# Patient Record
Sex: MALE | Race: WHITE | NOT HISPANIC OR LATINO | Employment: PART TIME | URBAN - METROPOLITAN AREA
[De-identification: names, ages, dates, MRNs, and addresses within clinical notes are randomized per-mention and may not be internally consistent; named-entity substitution may affect disease eponyms.]

---

## 2021-06-09 ENCOUNTER — HOSPITAL ENCOUNTER (EMERGENCY)
Facility: CLINIC | Age: 31
Discharge: HOME | End: 2021-06-09
Attending: EMERGENCY MEDICINE | Admitting: EMERGENCY MEDICINE
Payer: COMMERCIAL

## 2021-06-09 VITALS
OXYGEN SATURATION: 100 % | BODY MASS INDEX: 20.3 KG/M2 | HEART RATE: 78 BPM | DIASTOLIC BLOOD PRESSURE: 86 MMHG | HEIGHT: 71 IN | TEMPERATURE: 97.4 F | WEIGHT: 145 LBS | RESPIRATION RATE: 18 BRPM | SYSTOLIC BLOOD PRESSURE: 135 MMHG

## 2021-06-09 DIAGNOSIS — F10.920 ALCOHOLIC INTOXICATION WITHOUT COMPLICATION (H): ICD-10-CM

## 2021-06-09 PROCEDURE — 99283 EMERGENCY DEPT VISIT LOW MDM: CPT

## 2021-06-09 RX ORDER — CHLORDIAZEPOXIDE HYDROCHLORIDE 5 MG/1
10 CAPSULE, GELATIN COATED ORAL 3 TIMES DAILY PRN
Qty: 18 CAPSULE | Refills: 0 | Status: SHIPPED | OUTPATIENT
Start: 2021-06-09

## 2021-06-09 RX ORDER — ONDANSETRON 4 MG/1
4 TABLET, ORALLY DISINTEGRATING ORAL EVERY 8 HOURS PRN
Qty: 10 TABLET | Refills: 0 | Status: SHIPPED | OUTPATIENT
Start: 2021-06-09 | End: 2021-06-12

## 2021-06-09 ASSESSMENT — MIFFLIN-ST. JEOR: SCORE: 1644.85

## 2021-06-09 ASSESSMENT — ENCOUNTER SYMPTOMS: VOMITING: 1

## 2021-06-09 NOTE — ED TRIAGE NOTES
States he is going thru ETOH withdrawal. Last drink several hours ago. Usually drinks 1/2 L per day. Hx of seizures with wtihdrawl

## 2021-06-09 NOTE — ED PROVIDER NOTES
"  History   Chief Complaint:  Withdrawal       HPI   Gaston Griffith is a 30 year old male with history of alcoholism who presents with withdrawal. Gaston has been drinking about every day for the past 5 months, and in the past month he has been trying to quit and is working with a team of people to do so, but today he has been unable to keep any food or water down so he presents to the ED. He reports his last drink as being 3-4 hours ago, and says he has a prescription for Zofran. He denies feeling intoxicated at time of interview.    Review of Systems   Gastrointestinal: Positive for vomiting (unable to keep down food or water).   Psychiatric/Behavioral: Positive for behavioral problems (alcoholism).   All other systems reviewed and are negative.    Allergies:  Penicillins    Medications:  Zofran  Omeprazole  Keppra  Folic acid    Past Medical History:    GERD  Acute metabolic encephalopathy  Acute respiratory failure  Lactic acidosis  Hypocalcemia  Hyperglycemia  Alcoholism     Past Surgical History:    The patient denies past surgical history.     Social History:  Presents alone.  Is an alcoholic. Drinks one liter of alcohol every 2 days.  Is working with a team of people to quit drinking.    Physical Exam     Patient Vitals for the past 24 hrs:   BP Temp Temp src Pulse Resp SpO2 Height Weight   06/09/21 0958 135/86 97.4  F (36.3  C) Temporal 78 18 99 % 1.803 m (5' 11\") 65.8 kg (145 lb)       Physical Exam  Vitals: reviewed by me  General: Pt seen on [facility], [facility], cooperative, and alert to conversation  Eyes: Tracking well, clear conjunctiva BL  ENT: MMM, midline trachea.   Lungs: No tachypnea, no accessory muscle use. No respiratory distress.   CV: Rate as above  Abd: Soft, non tender, no guarding, no rebound. Non distended  MSK: no joint effusion.  No evidence of trauma  Skin: No rash  Neuro: Clear speech and no facial droop.  No tremors.  Psych: Not RIS, no e/o AH/VH, denies suicidality, denies " homicidality.      Emergency Department Course   Emergency Department Course:    Reviewed:  I reviewed nursing notes, vitals and past medical history    Assessments:  1005 I obtained history and examined the patient as noted above.   1229 I rechecked the patient and explained findings.    Disposition:  The patient was discharged to home.       Impression & Plan   Medical Decision Making:  This is a very pleasant 30-year-old male who presents to the emergency room with what appears to be a desire to stop drinking and to detox. He is currently slightly intoxicated clinically, and has a breathalyzer that shows 0.19. He was watched here in the ER for 3 hours and he did not develop any significant signs of alcohol withdrawal, apart from stated anxiety. For this reason I will give him Librium, I do think he is stable for outpatient management and he is not requiring IV Ativan at this time. He knows to come back to the ER immediately if his pain and withdrawal symptoms are not controlled with Librium. He has no suicidality, no homicidality, and does appear to be quite forthright and genuine in his desire to stop drinking. Red flags for him to come back were discussed at length, as well as outpatient follow-up/    Diagnosis:    ICD-10-CM    1. Alcoholic intoxication without complication (H)  F10.920        Discharge Medications:  New Prescriptions    CHLORDIAZEPOXIDE (LIBRIUM) 5 MG CAPSULE    Take 2 capsules (10 mg) by mouth 3 times daily as needed for anxiety    ONDANSETRON (ZOFRAN ODT) 4 MG ODT TAB    Take 1 tablet (4 mg) by mouth every 8 hours as needed       Scribe Disclosure:  I, Corona Beltran, am serving as a scribe at 10:15 AM on 6/9/2021 to document services personally performed by Sahil Perdue MD, based on my observations and the provider's statements to me.        Sahil Perdue MD  06/09/21 5380

## 2021-06-09 NOTE — DISCHARGE INSTRUCTIONS
As we discussed, you are at high risk for alcohol withdrawal, for this reason you will be given medications to go home with.  Come back to the ER immediately if the medications that we gave you do not bring you adequate relief, if you still have tremors or shakes after taking the medication, or with any other concerns or questions.

## 2024-03-26 ENCOUNTER — OFFICE VISIT (OUTPATIENT)
Dept: URGENT CARE | Facility: URGENT CARE | Age: 34
End: 2024-03-26
Payer: COMMERCIAL

## 2024-03-26 ENCOUNTER — NURSE TRIAGE (OUTPATIENT)
Dept: NURSING | Facility: CLINIC | Age: 34
End: 2024-03-26

## 2024-03-26 VITALS
DIASTOLIC BLOOD PRESSURE: 80 MMHG | RESPIRATION RATE: 15 BRPM | HEIGHT: 71 IN | WEIGHT: 165 LBS | SYSTOLIC BLOOD PRESSURE: 125 MMHG | HEART RATE: 88 BPM | OXYGEN SATURATION: 100 % | BODY MASS INDEX: 23.1 KG/M2 | TEMPERATURE: 98 F

## 2024-03-26 DIAGNOSIS — K64.4 EXTERNAL HEMORRHOIDS: ICD-10-CM

## 2024-03-26 DIAGNOSIS — K64.4 EXTERNAL HEMORRHOIDS: Primary | ICD-10-CM

## 2024-03-26 PROCEDURE — 99203 OFFICE O/P NEW LOW 30 MIN: CPT | Performed by: PHYSICIAN ASSISTANT

## 2024-03-26 RX ORDER — PRAZOSIN HYDROCHLORIDE 1 MG/1
1 CAPSULE ORAL AT BEDTIME
COMMUNITY
Start: 2023-12-22

## 2024-03-26 RX ORDER — HYDROCORTISONE 2.5 %
CREAM (GRAM) TOPICAL 2 TIMES DAILY
Qty: 30 G | Refills: 0 | Status: SHIPPED | OUTPATIENT
Start: 2024-03-26 | End: 2024-03-26

## 2024-03-26 RX ORDER — HYDROCORTISONE 2.5 %
CREAM (GRAM) TOPICAL 2 TIMES DAILY
Qty: 30 G | Refills: 0 | Status: SHIPPED | OUTPATIENT
Start: 2024-03-26

## 2024-03-26 RX ORDER — TRAZODONE HYDROCHLORIDE 50 MG/1
50 TABLET, FILM COATED ORAL AT BEDTIME
COMMUNITY
Start: 2024-03-11

## 2024-03-26 NOTE — PROGRESS NOTES
"URGENT CARE VISIT:    SUBJECTIVE:   Gaston Griffith is a 33 year old male who presents with rectal pain for 1 week. There are two bumps on rectum. Nothing makes it better or worse. Associated symptoms include none. He denies constipation, fever, and chills. He has tried none with no relief of symptoms.  Appetite is normal. Risk factors include none. Abdominal surgical history includes none.    PMH: History reviewed. No pertinent past medical history.  Allergies: Penicillins  Medications:   Current Outpatient Medications   Medication Sig Dispense Refill    hydrocortisone 2.5 % cream Apply topically 2 times daily for 14 days 30 g 0    prazosin (MINIPRESS) 1 MG capsule Take 1 mg by mouth at bedtime      traZODone (DESYREL) 50 MG tablet Take 50 mg by mouth at bedtime      chlordiazePOXIDE (LIBRIUM) 5 MG capsule Take 2 capsules (10 mg) by mouth 3 times daily as needed for anxiety 18 capsule 0     Social History:   Social History     Socioeconomic History    Marital status: Single     Spouse name: Not on file    Number of children: Not on file    Years of education: Not on file    Highest education level: Not on file   Occupational History    Not on file   Tobacco Use    Smoking status: Never    Smokeless tobacco: Current    Tobacco comments:     Zyns    Substance and Sexual Activity    Alcohol use: Not on file    Drug use: Not on file    Sexual activity: Not on file   Other Topics Concern    Not on file   Social History Narrative    Not on file     Social Determinants of Health     Financial Resource Strain: Not on file   Food Insecurity: Not on file   Transportation Needs: Not on file   Physical Activity: Not on file   Stress: Not on file   Social Connections: Not on file   Interpersonal Safety: Not on file   Housing Stability: Not on file       ROS: ROS otherwise found to be negative except as noted above.     OBJECTIVE:  /80   Pulse 88   Temp 98  F (36.7  C) (Temporal)   Resp 15   Ht 1.803 m (5' 11\")   Wt " 74.8 kg (165 lb)   SpO2 100%   BMI 23.01 kg/m    GENERAL APPEARANCE: healthy, alert and no distress  EYES: EOMI,  PERRL, conjunctiva clear  RESP: lungs clear to auscultation - no rales, rhonchi or wheezes  CV: regular rates and rhythm, normal S1 S2, no murmur noted  ABDOMEN:  soft, nontender, no HSM or masses and bowel sounds normal  RECTAL: small non-thrombosed hemorrhoid at 7 o'clock. No bleeding.  SKIN: no suspicious lesions or rashes      ASSESSMENT:     ICD-10-CM    1. External hemorrhoids  K64.4 hydrocortisone 2.5 % cream           PLAN:  Patient Instructions   Patient was educated on the natural course of hemorrhoids.  Conservative measures discussed including hemorrhoid cream (Preparation H), sitz bath, and high fiber diet. See your primary care provider if symptoms do not improve in two weeks. Seek emergency care if you develop severe pain or bleeding     Patient verbalized understanding and is agreeable to plan. The patient was discharged ambulatory and in stable condition.    Valerie Argueta PA-C ....................  3/26/2024   6:23 PM

## 2024-03-26 NOTE — TELEPHONE ENCOUNTER
Nurse Triage SBAR    Situation:   -pharmacy change    Background:   -Patient calling  -It is okay to call back and leave a detailed message at this number:  [phone]     Assessment:   -preferred pharmacy is now closed    Recommendation:   -RN was able to change pharmacy to [facility], as they are open later  -Call back with and questions, concerns, or any change in symptoms    ARELI BEAVERS RN 3/26/2024 6:52 PM     Reason for Disposition   [1] Prescription prescribed recently is not at pharmacy AND [2] triager has access to patient's EMR AND [3] prescription is recorded in the EMR    Protocols used: Medication Question Call-A-

## 2024-03-26 NOTE — PATIENT INSTRUCTIONS
Patient was educated on the natural course of hemorrhoids.  Conservative measures discussed including hemorrhoid cream (Preparation H), sitz bath, and high fiber diet. See your primary care provider if symptoms do not improve in two weeks. Seek emergency care if you develop severe pain or bleeding

## 2024-04-20 ENCOUNTER — HEALTH MAINTENANCE LETTER (OUTPATIENT)
Age: 34
End: 2024-04-20

## 2024-06-21 ENCOUNTER — OFFICE VISIT (OUTPATIENT)
Dept: URGENT CARE | Facility: URGENT CARE | Age: 34
End: 2024-06-21
Payer: COMMERCIAL

## 2024-06-21 VITALS
OXYGEN SATURATION: 99 % | WEIGHT: 170 LBS | TEMPERATURE: 98.1 F | DIASTOLIC BLOOD PRESSURE: 83 MMHG | SYSTOLIC BLOOD PRESSURE: 121 MMHG | BODY MASS INDEX: 23.71 KG/M2

## 2024-06-21 DIAGNOSIS — S91.332A PUNCTURE WOUND OF LEFT FOOT, INITIAL ENCOUNTER: Primary | ICD-10-CM

## 2024-06-21 PROCEDURE — 99213 OFFICE O/P EST LOW 20 MIN: CPT | Performed by: FAMILY MEDICINE

## 2024-06-21 RX ORDER — CIPROFLOXACIN 500 MG/1
500 TABLET, FILM COATED ORAL 2 TIMES DAILY
Qty: 10 TABLET | Refills: 0 | Status: SHIPPED | OUTPATIENT
Start: 2024-06-21 | End: 2024-06-26

## 2024-06-21 RX ORDER — CEFDINIR 300 MG/1
300 CAPSULE ORAL 2 TIMES DAILY WITH MEALS
COMMUNITY
Start: 2024-06-12

## 2024-06-21 ASSESSMENT — PAIN SCALES - GENERAL: PAINLEVEL: MODERATE PAIN (4)

## 2024-06-21 NOTE — PATIENT INSTRUCTIONS
Avoid vigorously scrubbing the wound.  Rinse the wound with tap water and normal soap.  Pat dry. Keep the wound covered with a clean Band-Aid.     If not at work, elevate the left foot above the level of the heart to decrease the pain.      If possible, apply something warm over the wound for 15 minutes at a time, every 2 hours while awake to improve wound healing.      Continue the Cefdinir for the sinus infection.  If, after finishing the Cefdinir, you develop fevers and chills, increasing pain, a lot of pus coming out of the wound, and/or spreading redness, fill the printed prescription for the Ciprofloxacin.    Go to the emergency room if you develop high fevers (100.4 F or higher).      Follow up if not better in 5-7 days.

## 2024-06-21 NOTE — PROGRESS NOTES
SUBJECTIVE:   Gaston Griffith is a 33 year old male presenting with a chief complaint of a puncture wound on the sole of the lateral midfoot region of the left foot.      Yesterday was barefoot when his left foot accidentally stepped on a clean nail.  No remnants of the nail are in the left foot, according to the patient.   .  Onset of symptoms was last night.      Current and Associated symptoms: There is still pain and some bleeding from the puncture wound site of the left foot.    Treatment measures tried include cleaning the wound, applying bandages.   Walking worsens the pain.  .  No obvious pus.  The pain at the left foot has been a little worse than last night.  The pain is now moderate after walking a lot at work today.      Predisposing factors include history of skin infections requiring hospitalization.  Patient has been on about 9-10 days of Cefdinir for a recent sinus infection.      His last tetanus booster was received February 1, 2021  .    Past Medical History:  Skin infections with complications requiring hospitalizations.     Current Outpatient Medications   Medication Sig Dispense Refill    cefdinir (OMNICEF) 300 MG capsule Take 300 mg by mouth 2 times daily (with meals)      traZODone (DESYREL) 50 MG tablet Take 50 mg by mouth at bedtime      chlordiazePOXIDE (LIBRIUM) 5 MG capsule Take 2 capsules (10 mg) by mouth 3 times daily as needed for anxiety 18 capsule 0    hydrocortisone 2.5 % cream Apply topically 2 times daily 30 g 0    prazosin (MINIPRESS) 1 MG capsule Take 1 mg by mouth at bedtime       Social History     Tobacco Use    Smoking status: Never    Smokeless tobacco: Current    Tobacco comments:     Zyns    Substance Use Topics    Alcohol use: Not on file     Patient works as a  at the [employer] in [place].     ROS:  CONSTITUTIONAL:negative for fever  INTEGUMENTARY/SKIN:  positive for puncture wound on the left foot.      OBJECTIVE:  /83   Temp 98.1  F (36.7  C) (Oral)    Wt 77.1 kg (170 lb)   SpO2 99%   BMI 23.71 kg/m    GENERAL APPEARANCE: healthy, alert and no distress  Extremities: plantar surface of the left foot (lateral midfoot region) has a puncture wound without evidence of active bleeding; however, the puncture wound is covered by uncoagulated blood.  No red streaks are present.  No lacerations/abrasions.      ASSESSMENT:  Puncture wound of the left foot.  Patient is up to date with his tetanus booster.  No infection seen.  He is currently taking Cefdinir for a sinus infection.      PLAN:  Avoid vigorously scrubbing the wound.  Rinse the wound with tap water and normal soap.  Pat dry. Keep the wound covered with a clean Band-Aid.     If not at work, elevate the left foot above the level of the heart to decrease the pain.      If possible, apply something warm over the wound for 15 minutes at a time, every 2 hours while awake to improve wound healing.      Continue the Cefdinir for the sinus infection.  If, after finishing the Cefdinir, you develop fevers and chills, increasing pain, a lot of pus coming out of the wound, and/or spreading redness, fill the printed prescription for the Ciprofloxacin.  (I printed a Rx for Ciprofloxacin for the patient.).     Go to the emergency room if you develop high fevers (100.4 F or higher).      Follow up if not better in 5-7 days.      Edil Traylor MD

## 2025-05-11 ENCOUNTER — HEALTH MAINTENANCE LETTER (OUTPATIENT)
Age: 35
End: 2025-05-11